# Patient Record
Sex: FEMALE | Race: WHITE | NOT HISPANIC OR LATINO
[De-identification: names, ages, dates, MRNs, and addresses within clinical notes are randomized per-mention and may not be internally consistent; named-entity substitution may affect disease eponyms.]

---

## 2019-03-26 DIAGNOSIS — K21.9 GASTRO-ESOPHAGEAL REFLUX DISEASE W/OUT ESOPHAGITIS: ICD-10-CM

## 2019-03-26 DIAGNOSIS — Z87.09 PERSONAL HISTORY OF OTHER DISEASES OF THE RESPIRATORY SYSTEM: ICD-10-CM

## 2019-03-26 DIAGNOSIS — E78.5 HYPERLIPIDEMIA, UNSPECIFIED: ICD-10-CM

## 2019-03-26 DIAGNOSIS — N28.1 CYST OF KIDNEY, ACQUIRED: ICD-10-CM

## 2019-03-26 DIAGNOSIS — I10 ESSENTIAL (PRIMARY) HYPERTENSION: ICD-10-CM

## 2019-03-26 PROBLEM — Z00.00 ENCOUNTER FOR PREVENTIVE HEALTH EXAMINATION: Status: ACTIVE | Noted: 2019-03-26

## 2019-03-28 ENCOUNTER — APPOINTMENT (OUTPATIENT)
Dept: ELECTROPHYSIOLOGY | Facility: CLINIC | Age: 84
End: 2019-03-28

## 2019-03-30 ENCOUNTER — EMERGENCY (EMERGENCY)
Facility: HOSPITAL | Age: 84
LOS: 1 days | Discharge: HOME | End: 2019-03-30
Attending: EMERGENCY MEDICINE | Admitting: EMERGENCY MEDICINE
Payer: MEDICARE

## 2019-03-30 VITALS
TEMPERATURE: 99 F | OXYGEN SATURATION: 99 % | RESPIRATION RATE: 16 BRPM | HEART RATE: 69 BPM | DIASTOLIC BLOOD PRESSURE: 84 MMHG | SYSTOLIC BLOOD PRESSURE: 170 MMHG

## 2019-03-30 VITALS
HEIGHT: 65 IN | HEART RATE: 78 BPM | DIASTOLIC BLOOD PRESSURE: 87 MMHG | TEMPERATURE: 98 F | RESPIRATION RATE: 17 BRPM | SYSTOLIC BLOOD PRESSURE: 170 MMHG | WEIGHT: 145.06 LBS

## 2019-03-30 DIAGNOSIS — Z90.49 ACQUIRED ABSENCE OF OTHER SPECIFIED PARTS OF DIGESTIVE TRACT: Chronic | ICD-10-CM

## 2019-03-30 LAB
ALBUMIN SERPL ELPH-MCNC: 3.6 G/DL — SIGNIFICANT CHANGE UP (ref 3.3–5)
ALP SERPL-CCNC: 61 U/L — SIGNIFICANT CHANGE UP (ref 40–120)
ALT FLD-CCNC: 22 U/L — SIGNIFICANT CHANGE UP (ref 12–78)
ANION GAP SERPL CALC-SCNC: 10 MMOL/L — SIGNIFICANT CHANGE UP (ref 5–17)
APTT BLD: 30 SEC — SIGNIFICANT CHANGE UP (ref 27.5–36.3)
AST SERPL-CCNC: 14 U/L — LOW (ref 15–37)
BASOPHILS # BLD AUTO: 0.02 K/UL — SIGNIFICANT CHANGE UP (ref 0–0.2)
BASOPHILS NFR BLD AUTO: 0.2 % — SIGNIFICANT CHANGE UP (ref 0–2)
BILIRUB SERPL-MCNC: 0.5 MG/DL — SIGNIFICANT CHANGE UP (ref 0.2–1.2)
BUN SERPL-MCNC: 28 MG/DL — HIGH (ref 7–23)
CALCIUM SERPL-MCNC: 9.4 MG/DL — SIGNIFICANT CHANGE UP (ref 8.5–10.1)
CHLORIDE SERPL-SCNC: 109 MMOL/L — HIGH (ref 96–108)
CK MB CFR SERPL CALC: 2.5 NG/ML — SIGNIFICANT CHANGE UP (ref 0–3.6)
CO2 SERPL-SCNC: 24 MMOL/L — SIGNIFICANT CHANGE UP (ref 22–31)
CREAT SERPL-MCNC: 1 MG/DL — SIGNIFICANT CHANGE UP (ref 0.5–1.3)
EOSINOPHIL # BLD AUTO: 0.12 K/UL — SIGNIFICANT CHANGE UP (ref 0–0.5)
EOSINOPHIL NFR BLD AUTO: 1.4 % — SIGNIFICANT CHANGE UP (ref 0–6)
GLUCOSE SERPL-MCNC: 108 MG/DL — HIGH (ref 70–99)
HCT VFR BLD CALC: 37.2 % — SIGNIFICANT CHANGE UP (ref 34.5–45)
HGB BLD-MCNC: 12.8 G/DL — SIGNIFICANT CHANGE UP (ref 11.5–15.5)
IMM GRANULOCYTES NFR BLD AUTO: 0.4 % — SIGNIFICANT CHANGE UP (ref 0–1.5)
INR BLD: 1.12 RATIO — SIGNIFICANT CHANGE UP (ref 0.88–1.16)
LYMPHOCYTES # BLD AUTO: 1.61 K/UL — SIGNIFICANT CHANGE UP (ref 1–3.3)
LYMPHOCYTES # BLD AUTO: 18.8 % — SIGNIFICANT CHANGE UP (ref 13–44)
MCHC RBC-ENTMCNC: 31.1 PG — SIGNIFICANT CHANGE UP (ref 27–34)
MCHC RBC-ENTMCNC: 34.4 GM/DL — SIGNIFICANT CHANGE UP (ref 32–36)
MCV RBC AUTO: 90.5 FL — SIGNIFICANT CHANGE UP (ref 80–100)
MONOCYTES # BLD AUTO: 0.69 K/UL — SIGNIFICANT CHANGE UP (ref 0–0.9)
MONOCYTES NFR BLD AUTO: 8.1 % — SIGNIFICANT CHANGE UP (ref 2–14)
NEUTROPHILS # BLD AUTO: 6.1 K/UL — SIGNIFICANT CHANGE UP (ref 1.8–7.4)
NEUTROPHILS NFR BLD AUTO: 71.1 % — SIGNIFICANT CHANGE UP (ref 43–77)
NRBC # BLD: 0 /100 WBCS — SIGNIFICANT CHANGE UP (ref 0–0)
PLATELET # BLD AUTO: 264 K/UL — SIGNIFICANT CHANGE UP (ref 150–400)
POTASSIUM SERPL-MCNC: 4 MMOL/L — SIGNIFICANT CHANGE UP (ref 3.5–5.3)
POTASSIUM SERPL-SCNC: 4 MMOL/L — SIGNIFICANT CHANGE UP (ref 3.5–5.3)
PROT SERPL-MCNC: 7.1 G/DL — SIGNIFICANT CHANGE UP (ref 6–8.3)
PROTHROM AB SERPL-ACNC: 12.7 SEC — SIGNIFICANT CHANGE UP (ref 10–12.9)
RBC # BLD: 4.11 M/UL — SIGNIFICANT CHANGE UP (ref 3.8–5.2)
RBC # FLD: 11.9 % — SIGNIFICANT CHANGE UP (ref 10.3–14.5)
SODIUM SERPL-SCNC: 143 MMOL/L — SIGNIFICANT CHANGE UP (ref 135–145)
TROPONIN I SERPL-MCNC: <.015 NG/ML — SIGNIFICANT CHANGE UP (ref 0.01–0.04)
WBC # BLD: 8.57 K/UL — SIGNIFICANT CHANGE UP (ref 3.8–10.5)
WBC # FLD AUTO: 8.57 K/UL — SIGNIFICANT CHANGE UP (ref 3.8–10.5)

## 2019-03-30 PROCEDURE — 85730 THROMBOPLASTIN TIME PARTIAL: CPT

## 2019-03-30 PROCEDURE — 93005 ELECTROCARDIOGRAM TRACING: CPT

## 2019-03-30 PROCEDURE — 99283 EMERGENCY DEPT VISIT LOW MDM: CPT | Mod: 25

## 2019-03-30 PROCEDURE — 85610 PROTHROMBIN TIME: CPT

## 2019-03-30 PROCEDURE — 80053 COMPREHEN METABOLIC PANEL: CPT

## 2019-03-30 PROCEDURE — 36415 COLL VENOUS BLD VENIPUNCTURE: CPT

## 2019-03-30 PROCEDURE — 82553 CREATINE MB FRACTION: CPT

## 2019-03-30 PROCEDURE — 85027 COMPLETE CBC AUTOMATED: CPT

## 2019-03-30 PROCEDURE — 84484 ASSAY OF TROPONIN QUANT: CPT

## 2019-03-30 PROCEDURE — 99283 EMERGENCY DEPT VISIT LOW MDM: CPT

## 2019-03-30 PROCEDURE — 93010 ELECTROCARDIOGRAM REPORT: CPT

## 2019-03-30 RX ORDER — ALPRAZOLAM 0.25 MG
1 TABLET ORAL
Qty: 6 | Refills: 0 | OUTPATIENT
Start: 2019-03-30 | End: 2019-04-01

## 2019-03-30 NOTE — ED ADULT NURSE NOTE - OBJECTIVE STATEMENT
88 y/o female presents to the ed c/o an episode of sob chest pain and diaphoresis while at home. pt attributes these symptoms to her being anxious because her son is currently inpatient psych at another hospital. symptoms have since resolved. denies nausea vomiting fever chills sob headache abdominal pain and urinary problems.

## 2019-03-30 NOTE — ED ADULT NURSE NOTE - NSIMPLEMENTINTERV_GEN_ALL_ED
Implemented All Fall with Harm Risk Interventions:  [place] to call system. Call bell, personal items and telephone within reach. Instruct patient to call for assistance. Room bathroom lighting operational. Non-slip footwear when patient is off stretcher. Physically safe environment: no spills, clutter or unnecessary equipment. Stretcher in lowest position, wheels locked, appropriate side rails in place. Provide visual cue, wrist band, yellow gown, etc. Monitor gait and stability. Monitor for mental status changes and reorient to person, place, and time. Review medications for side effects contributing to fall risk. Reinforce activity limits and safety measures with patient and family. Provide visual clues: red socks.

## 2019-03-30 NOTE — ED PROVIDER NOTE - OBJECTIVE STATEMENT
87 female presents to ER with son c/o stress/anxiety. Patient states she has been under a lot of stress lately, her other son in psychiatric hung, visits him twice a day, states today she woke up at 4:30am and not feeling well, having racing heart, mild shortness of breath with some diaphoreris, patient took her lisinopril just prior to arrival. Patient now feeling better, no chest pain, currently being worked up for syncope, had recent halter monitor.  PCP- Lukas Jaimes  Cardio- Brandspiegel

## 2019-03-30 NOTE — ED PROVIDER NOTE - CARE PROVIDER_API CALL
Mynor Jaimes (DO)  Internal Medicine  [address], [address] [place] [address]  Phone: [phone]  Fax: [phone]  Follow Up Time:

## 2019-03-30 NOTE — ED PROVIDER NOTE - CARE PLAN
Principal Discharge DX:	HTN (hypertension) with goal to be determined  Secondary Diagnosis:	Stress at home

## 2019-03-30 NOTE — ED PROVIDER NOTE - PROGRESS NOTE DETAILS
patient feeling better, no chest pain, wants to go home, agrees to f/u with PMD, understands to return to ER for worsening of symptoms

## 2019-04-23 ENCOUNTER — APPOINTMENT (OUTPATIENT)
Dept: ELECTROPHYSIOLOGY | Facility: CLINIC | Age: 84
End: 2019-04-23
Payer: MEDICARE

## 2019-04-23 ENCOUNTER — NON-APPOINTMENT (OUTPATIENT)
Age: 84
End: 2019-04-23

## 2019-04-23 VITALS
HEIGHT: 65.5 IN | BODY MASS INDEX: 23.37 KG/M2 | WEIGHT: 142 LBS | DIASTOLIC BLOOD PRESSURE: 80 MMHG | HEART RATE: 84 BPM | SYSTOLIC BLOOD PRESSURE: 144 MMHG | OXYGEN SATURATION: 95 %

## 2019-04-23 DIAGNOSIS — I47.1 SUPRAVENTRICULAR TACHYCARDIA: ICD-10-CM

## 2019-04-23 DIAGNOSIS — R55 SYNCOPE AND COLLAPSE: ICD-10-CM

## 2019-04-23 PROCEDURE — 99205 OFFICE O/P NEW HI 60 MIN: CPT

## 2019-04-23 PROCEDURE — 93000 ELECTROCARDIOGRAM COMPLETE: CPT

## 2019-04-23 RX ORDER — LISINOPRIL 10 MG/1
10 TABLET ORAL DAILY
Qty: 30 | Refills: 6 | Status: ACTIVE | COMMUNITY

## 2019-04-23 RX ORDER — SACCHAROMYCES BOULARDII 50 MG
CAPSULE ORAL
Refills: 0 | Status: ACTIVE | COMMUNITY

## 2019-04-23 NOTE — DISCUSSION/SUMMARY
[FreeTextEntry1] : 88 year old woman with recurrent episodes of syncope.  Evaluation has been significant for normal LV function and normal conduction intervals on ECG.  Loop monitoring shows rapid supraventricular arrhythmia with no offset pause.  This supports the assertion of adequate conduction and makes a bradyarrhythmia seemingly unlikely.  It is possible that prolonged tachyarrhythmia could result in hypotension given her LVH with impaired relaxation.  However her history of feeling warm (?vasodilation) and poor hydration are more consistent with a vasovagal mechanism.  We discussed empiric lifestyle modifications.  I suggested that we start a low dose beta blocker therapy and that we proceed with an ILR.  SHould she have recurrent episodes despite lifestyle modifications and beta blocker therapy we may reassess for more aggressive therapy such EP testing and possible ablation versus more aggressive pharmacotherapy.

## 2019-04-23 NOTE — HISTORY OF PRESENT ILLNESS
[FreeTextEntry1] : 88 year old woman with a history of hypertension presents for an evaluation of syncope. A few weeks ago she was out to dinner.  She felt very warm and put her head down and lost consciousness.  Her diet was very poor that day.  as she was anticipating the dinner party. She refused to go to the hospital and was advised to follow up with her PMD.  She was subsequently referred for cardiovascular evaluation.  As part of this evaluation she underwent a 4 week loop.  She did have 2 other episodes of syncope.  The first was 2 years ago and both were believed to be in the setting of poor PO intake.  She has not hurt herself.  She does feel weak with the episodes.

## 2019-04-23 NOTE — PHYSICAL EXAM
[General Appearance - Well Developed] : well developed [Normal Appearance] : normal appearance [Well Groomed] : well groomed [General Appearance - Well Nourished] : well nourished [No Deformities] : no deformities [General Appearance - In No Acute Distress] : no acute distress [Normal Conjunctiva] : the conjunctiva exhibited no abnormalities [Eyelids - No Xanthelasma] : the eyelids demonstrated no xanthelasmas [Normal Oral Mucosa] : normal oral mucosa [No Oral Cyanosis] : no oral cyanosis [No Oral Pallor] : no oral pallor [Normal Jugular Venous A Waves Present] : normal jugular venous A waves present [No Jugular Venous Solorzano A Waves] : no jugular venous solorzano A waves [Normal Jugular Venous V Waves Present] : normal jugular venous V waves present [Heart Rate And Rhythm] : heart rate and rhythm were normal [Systolic grade ___/6] : A grade [unfilled]/6 systolic murmur was heard. [Heart Sounds] : normal S1 and S2 [Respiration, Rhythm And Depth] : normal respiratory rhythm and effort [Exaggerated Use Of Accessory Muscles For Inspiration] : no accessory muscle use [Auscultation Breath Sounds / Voice Sounds] : lungs were clear to auscultation bilaterally [Abdomen Tenderness] : non-tender [Abdomen Soft] : soft [Abdomen Mass (___ Cm)] : no abdominal mass palpated [Abnormal Walk] : normal gait [Gait - Sufficient For Exercise Testing] : the gait was sufficient for exercise testing [Nail Clubbing] : no clubbing of the fingernails [Cyanosis, Localized] : no localized cyanosis [Petechial Hemorrhages (___cm)] : no petechial hemorrhages [Skin Color & Pigmentation] : normal skin color and pigmentation [] : no rash [No Venous Stasis] : no venous stasis [No Skin Ulcers] : no skin ulcer [Skin Lesions] : no skin lesions [No Xanthoma] : no  xanthoma was observed [Oriented To Time, Place, And Person] : oriented to person, place, and time [Affect] : the affect was normal [Mood] : the mood was normal [No Anxiety] : not feeling anxious

## 2019-05-01 PROBLEM — I10 ESSENTIAL (PRIMARY) HYPERTENSION: Chronic | Status: ACTIVE | Noted: 2019-03-30

## 2019-05-06 ENCOUNTER — OUTPATIENT (OUTPATIENT)
Dept: OUTPATIENT SERVICES | Facility: HOSPITAL | Age: 84
LOS: 1 days | End: 2019-05-06
Payer: MEDICARE

## 2019-05-06 VITALS
RESPIRATION RATE: 16 BRPM | WEIGHT: 141.98 LBS | OXYGEN SATURATION: 98 % | HEIGHT: 65 IN | SYSTOLIC BLOOD PRESSURE: 144 MMHG | TEMPERATURE: 98 F | DIASTOLIC BLOOD PRESSURE: 86 MMHG

## 2019-05-06 DIAGNOSIS — Z90.49 ACQUIRED ABSENCE OF OTHER SPECIFIED PARTS OF DIGESTIVE TRACT: Chronic | ICD-10-CM

## 2019-05-06 DIAGNOSIS — Z98.49 CATARACT EXTRACTION STATUS, UNSPECIFIED EYE: Chronic | ICD-10-CM

## 2019-05-06 DIAGNOSIS — R55 SYNCOPE AND COLLAPSE: ICD-10-CM

## 2019-05-06 DIAGNOSIS — Z90.710 ACQUIRED ABSENCE OF BOTH CERVIX AND UTERUS: Chronic | ICD-10-CM

## 2019-05-06 PROCEDURE — 33285 INSJ SUBQ CAR RHYTHM MNTR: CPT

## 2019-05-06 PROCEDURE — C1764: CPT

## 2019-05-06 PROCEDURE — 93005 ELECTROCARDIOGRAM TRACING: CPT

## 2019-05-06 PROCEDURE — 99214 OFFICE O/P EST MOD 30 MIN: CPT

## 2019-05-06 PROCEDURE — 93010 ELECTROCARDIOGRAM REPORT: CPT

## 2019-05-06 RX ORDER — LISINOPRIL 2.5 MG/1
1 TABLET ORAL
Qty: 0 | Refills: 0 | COMMUNITY

## 2019-05-06 RX ORDER — LISINOPRIL 2.5 MG/1
0 TABLET ORAL
Qty: 0 | Refills: 0 | COMMUNITY

## 2019-05-06 RX ORDER — METOPROLOL TARTRATE 50 MG
1 TABLET ORAL
Qty: 0 | Refills: 0 | COMMUNITY

## 2019-05-06 RX ORDER — L.ACIDOPH/B.ANIMALIS/B.LONGUM 15B CELL
0 CAPSULE ORAL
Qty: 0 | Refills: 0 | COMMUNITY

## 2019-05-06 RX ORDER — HYDRALAZINE HCL 50 MG
5 TABLET ORAL ONCE
Qty: 0 | Refills: 0 | Status: COMPLETED | OUTPATIENT
Start: 2019-05-06 | End: 2019-05-06

## 2019-05-06 RX ORDER — SACCHAROMYCES BOULARDII 250 MG
1 POWDER IN PACKET (EA) ORAL
Qty: 0 | Refills: 0 | COMMUNITY

## 2019-05-06 RX ADMIN — Medication 5 MILLIGRAM(S): at 17:55

## 2019-05-06 NOTE — H&P CARDIOLOGY - PMH
GERD (gastroesophageal reflux disease)    HLD (hyperlipidemia)    Hypertension    Renal cyst    SVT (supraventricular tachycardia)    Syncope

## 2019-05-06 NOTE — H&P CARDIOLOGY - HISTORY OF PRESENT ILLNESS
This is a 87 yo   female with PMH of HTN, HLD, GERD, renal cyst, SVT and syncope. Seen and evaluated by Dr Novak for an evaluation of syncope. A few weeks ago she was out to dinner. She felt very warm and put her head down and lost consciousness. Her diet was very poor that day. as she was anticipating the dinner party. She refused to go to the hospital and was advised to follow up with her PMD. She was subsequently referred for cardiovascular evaluation. As part of this evaluation she underwent a 4 week loop. She did have 2 other episodes of syncope. The first was 2 years ago and both were believed to be in the setting of poor PO intake. She has not hurt herself. She does feel weak with the episodes.  Evaluation has been significant for normal LV function and normal conduction intervals on ECG.  Monitoring shows rapid supraventricular arrhythmia with no offset pause. This supports the assertion of adequate conduction and makes a bradyarrhythmia seemingly unlikely. It is possible that prolonged tachyarrhythmia could result in hypotension given her LVH with impaired relaxation. However her history of feeling warm (?vasodilation) and poor hydration are more consistent with a vasovagal mechanism. her and Dr Rosenthal discussed empiric lifestyle modifications and suggested that we start a low dose beta blocker therapy and that we proceed with an ILR. SHould she have recurrent episodes despite lifestyle modifications and beta blocker therapy we may reassess for more aggressive therapy such EP testing and possible ablation versus more aggressive pharmacotherapy.   Presents here today for Loop implant.  Presently asymptomatic.     EKG ( last office visit): today, Sinus Rhythm. Early transition. 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   30 day monitor showed a PAT then a erin of SVT > 200 bpm. There is no conversion pause.   Echo: 2/8/2019, Normal LV size and function. Mild concentric LVH. Impaired relaxation. LVEF 65-70%.          PCP- Lukas Jaimes  Cardio- Brandspiegel

## 2019-05-06 NOTE — H&P CARDIOLOGY - PSH
History of cholecystectomy    History of hysterectomy with bilateral oophorectomy    Status post cataract surgery

## 2019-05-15 ENCOUNTER — NON-APPOINTMENT (OUTPATIENT)
Age: 84
End: 2019-05-15

## 2019-05-15 ENCOUNTER — APPOINTMENT (OUTPATIENT)
Dept: ELECTROPHYSIOLOGY | Facility: CLINIC | Age: 84
End: 2019-05-15
Payer: MEDICARE

## 2019-05-15 VITALS
WEIGHT: 142 LBS | OXYGEN SATURATION: 99 % | SYSTOLIC BLOOD PRESSURE: 142 MMHG | HEIGHT: 65.5 IN | BODY MASS INDEX: 23.37 KG/M2 | HEART RATE: 79 BPM | DIASTOLIC BLOOD PRESSURE: 72 MMHG

## 2019-05-15 PROCEDURE — 93291 INTERROG DEV EVAL SCRMS IP: CPT

## 2019-05-15 PROCEDURE — 93000 ELECTROCARDIOGRAM COMPLETE: CPT | Mod: 59

## 2019-07-08 ENCOUNTER — APPOINTMENT (OUTPATIENT)
Dept: ELECTROPHYSIOLOGY | Facility: CLINIC | Age: 84
End: 2019-07-08
Payer: MEDICARE

## 2019-07-08 PROCEDURE — 93298 REM INTERROG DEV EVAL SCRMS: CPT

## 2019-07-08 PROCEDURE — 93299: CPT

## 2019-08-08 ENCOUNTER — APPOINTMENT (OUTPATIENT)
Dept: ELECTROPHYSIOLOGY | Facility: CLINIC | Age: 84
End: 2019-08-08
Payer: MEDICARE

## 2019-08-08 PROCEDURE — 93298 REM INTERROG DEV EVAL SCRMS: CPT

## 2019-08-08 PROCEDURE — 93299: CPT

## 2019-08-19 PROBLEM — N28.1 CYST OF KIDNEY, ACQUIRED: Chronic | Status: ACTIVE | Noted: 2019-05-06

## 2019-08-19 PROBLEM — E78.5 HYPERLIPIDEMIA, UNSPECIFIED: Chronic | Status: ACTIVE | Noted: 2019-05-06

## 2019-08-19 PROBLEM — R55 SYNCOPE AND COLLAPSE: Chronic | Status: ACTIVE | Noted: 2019-05-06

## 2019-08-19 PROBLEM — I47.1 SUPRAVENTRICULAR TACHYCARDIA: Chronic | Status: ACTIVE | Noted: 2019-05-06

## 2019-08-19 PROBLEM — K21.9 GASTRO-ESOPHAGEAL REFLUX DISEASE WITHOUT ESOPHAGITIS: Chronic | Status: ACTIVE | Noted: 2019-05-06

## 2019-09-12 ENCOUNTER — APPOINTMENT (OUTPATIENT)
Dept: ELECTROPHYSIOLOGY | Facility: CLINIC | Age: 84
End: 2019-09-12
Payer: MEDICARE

## 2019-09-12 PROCEDURE — 93298 REM INTERROG DEV EVAL SCRMS: CPT

## 2019-09-12 PROCEDURE — 93299: CPT

## 2019-10-15 ENCOUNTER — APPOINTMENT (OUTPATIENT)
Dept: ELECTROPHYSIOLOGY | Facility: CLINIC | Age: 84
End: 2019-10-15
Payer: MEDICARE

## 2019-10-15 PROCEDURE — 93298 REM INTERROG DEV EVAL SCRMS: CPT

## 2019-10-15 PROCEDURE — 93299: CPT

## 2019-11-15 ENCOUNTER — APPOINTMENT (OUTPATIENT)
Dept: ELECTROPHYSIOLOGY | Facility: CLINIC | Age: 84
End: 2019-11-15
Payer: MEDICARE

## 2019-11-15 PROCEDURE — 93298 REM INTERROG DEV EVAL SCRMS: CPT

## 2019-11-15 PROCEDURE — 93299: CPT

## 2019-12-09 ENCOUNTER — RX RENEWAL (OUTPATIENT)
Age: 84
End: 2019-12-09

## 2019-12-24 ENCOUNTER — APPOINTMENT (OUTPATIENT)
Dept: ELECTROPHYSIOLOGY | Facility: CLINIC | Age: 84
End: 2019-12-24
Payer: MEDICARE

## 2019-12-24 PROCEDURE — 93298 REM INTERROG DEV EVAL SCRMS: CPT

## 2019-12-24 PROCEDURE — 93299: CPT

## 2020-01-24 ENCOUNTER — APPOINTMENT (OUTPATIENT)
Dept: ELECTROPHYSIOLOGY | Facility: CLINIC | Age: 85
End: 2020-01-24
Payer: MEDICARE

## 2020-01-24 PROCEDURE — 93298 REM INTERROG DEV EVAL SCRMS: CPT

## 2020-01-24 PROCEDURE — G2066: CPT

## 2020-02-24 ENCOUNTER — APPOINTMENT (OUTPATIENT)
Dept: ELECTROPHYSIOLOGY | Facility: CLINIC | Age: 85
End: 2020-02-24
Payer: MEDICARE

## 2020-02-24 PROCEDURE — 93298 REM INTERROG DEV EVAL SCRMS: CPT

## 2020-02-24 PROCEDURE — G2066: CPT

## 2020-03-26 ENCOUNTER — APPOINTMENT (OUTPATIENT)
Dept: ELECTROPHYSIOLOGY | Facility: CLINIC | Age: 85
End: 2020-03-26
Payer: MEDICARE

## 2020-03-26 PROCEDURE — 93298 REM INTERROG DEV EVAL SCRMS: CPT

## 2020-03-26 PROCEDURE — G2066: CPT

## 2020-04-27 ENCOUNTER — APPOINTMENT (OUTPATIENT)
Dept: ELECTROPHYSIOLOGY | Facility: CLINIC | Age: 85
End: 2020-04-27
Payer: MEDICARE

## 2020-04-27 PROCEDURE — G2066: CPT

## 2020-04-27 PROCEDURE — 93298 REM INTERROG DEV EVAL SCRMS: CPT

## 2020-05-28 ENCOUNTER — APPOINTMENT (OUTPATIENT)
Dept: ELECTROPHYSIOLOGY | Facility: CLINIC | Age: 85
End: 2020-05-28
Payer: MEDICARE

## 2020-05-28 PROCEDURE — G2066: CPT

## 2020-05-28 PROCEDURE — 93298 REM INTERROG DEV EVAL SCRMS: CPT

## 2020-06-08 ENCOUNTER — RX RENEWAL (OUTPATIENT)
Age: 85
End: 2020-06-08

## 2020-08-03 ENCOUNTER — APPOINTMENT (OUTPATIENT)
Dept: ELECTROPHYSIOLOGY | Facility: CLINIC | Age: 85
End: 2020-08-03
Payer: MEDICARE

## 2020-08-03 PROCEDURE — G2066: CPT

## 2020-08-03 PROCEDURE — 93298 REM INTERROG DEV EVAL SCRMS: CPT

## 2020-09-04 ENCOUNTER — APPOINTMENT (OUTPATIENT)
Dept: ELECTROPHYSIOLOGY | Facility: CLINIC | Age: 85
End: 2020-09-04
Payer: MEDICARE

## 2020-09-04 PROCEDURE — 93298 REM INTERROG DEV EVAL SCRMS: CPT

## 2020-09-04 PROCEDURE — G2066: CPT

## 2020-09-23 ENCOUNTER — RX RENEWAL (OUTPATIENT)
Age: 85
End: 2020-09-23

## 2020-10-09 ENCOUNTER — APPOINTMENT (OUTPATIENT)
Dept: ELECTROPHYSIOLOGY | Facility: CLINIC | Age: 85
End: 2020-10-09
Payer: MEDICARE

## 2020-10-09 PROCEDURE — G2066: CPT

## 2020-10-09 PROCEDURE — 93298 REM INTERROG DEV EVAL SCRMS: CPT

## 2020-11-13 ENCOUNTER — APPOINTMENT (OUTPATIENT)
Dept: ELECTROPHYSIOLOGY | Facility: CLINIC | Age: 85
End: 2020-11-13
Payer: MEDICARE

## 2020-11-13 PROCEDURE — G2066: CPT

## 2020-11-13 PROCEDURE — 93298 REM INTERROG DEV EVAL SCRMS: CPT

## 2020-12-11 NOTE — ED ADULT NURSE NOTE - NSFALLRSKASSESASSIST_ED_ALL_ED
Spoke with pt and she always gets yeast infections from antibiotics. Per Dr Antoinette White prescription sent for this along with the antibiotic to [facility] and pt aware. no

## 2020-12-18 ENCOUNTER — APPOINTMENT (OUTPATIENT)
Dept: ELECTROPHYSIOLOGY | Facility: CLINIC | Age: 85
End: 2020-12-18
Payer: MEDICARE

## 2020-12-18 PROCEDURE — 93298 REM INTERROG DEV EVAL SCRMS: CPT

## 2020-12-18 PROCEDURE — G2066: CPT

## 2021-01-21 ENCOUNTER — APPOINTMENT (OUTPATIENT)
Dept: ELECTROPHYSIOLOGY | Facility: CLINIC | Age: 86
End: 2021-01-21
Payer: MEDICARE

## 2021-01-21 PROCEDURE — 93298 REM INTERROG DEV EVAL SCRMS: CPT

## 2021-01-21 PROCEDURE — G2066: CPT

## 2021-02-26 ENCOUNTER — NON-APPOINTMENT (OUTPATIENT)
Age: 86
End: 2021-02-26

## 2021-02-26 ENCOUNTER — APPOINTMENT (OUTPATIENT)
Dept: ELECTROPHYSIOLOGY | Facility: CLINIC | Age: 86
End: 2021-02-26
Payer: MEDICARE

## 2021-02-26 PROCEDURE — 93298 REM INTERROG DEV EVAL SCRMS: CPT

## 2021-02-26 PROCEDURE — G2066: CPT

## 2021-04-02 ENCOUNTER — NON-APPOINTMENT (OUTPATIENT)
Age: 86
End: 2021-04-02

## 2021-04-02 ENCOUNTER — APPOINTMENT (OUTPATIENT)
Dept: ELECTROPHYSIOLOGY | Facility: CLINIC | Age: 86
End: 2021-04-02
Payer: MEDICARE

## 2021-04-02 PROCEDURE — 93298 REM INTERROG DEV EVAL SCRMS: CPT

## 2021-04-02 PROCEDURE — G2066: CPT

## 2021-05-07 ENCOUNTER — NON-APPOINTMENT (OUTPATIENT)
Age: 86
End: 2021-05-07

## 2021-05-07 ENCOUNTER — APPOINTMENT (OUTPATIENT)
Dept: ELECTROPHYSIOLOGY | Facility: CLINIC | Age: 86
End: 2021-05-07

## 2021-05-07 ENCOUNTER — APPOINTMENT (OUTPATIENT)
Dept: ELECTROPHYSIOLOGY | Facility: CLINIC | Age: 86
End: 2021-05-07
Payer: MEDICARE

## 2021-05-07 PROCEDURE — G2066: CPT

## 2021-05-07 PROCEDURE — 93298 REM INTERROG DEV EVAL SCRMS: CPT

## 2021-06-11 ENCOUNTER — NON-APPOINTMENT (OUTPATIENT)
Age: 86
End: 2021-06-11

## 2021-06-11 ENCOUNTER — APPOINTMENT (OUTPATIENT)
Dept: ELECTROPHYSIOLOGY | Facility: CLINIC | Age: 86
End: 2021-06-11
Payer: MEDICARE

## 2021-06-11 PROCEDURE — 93298 REM INTERROG DEV EVAL SCRMS: CPT

## 2021-06-11 PROCEDURE — G2066: CPT

## 2021-07-16 ENCOUNTER — NON-APPOINTMENT (OUTPATIENT)
Age: 86
End: 2021-07-16

## 2021-07-16 ENCOUNTER — APPOINTMENT (OUTPATIENT)
Dept: ELECTROPHYSIOLOGY | Facility: CLINIC | Age: 86
End: 2021-07-16
Payer: MEDICARE

## 2021-07-16 PROCEDURE — 93298 REM INTERROG DEV EVAL SCRMS: CPT

## 2021-07-16 PROCEDURE — G2066: CPT

## 2021-08-16 ENCOUNTER — RX RENEWAL (OUTPATIENT)
Age: 86
End: 2021-08-16

## 2021-08-20 ENCOUNTER — APPOINTMENT (OUTPATIENT)
Dept: ELECTROPHYSIOLOGY | Facility: CLINIC | Age: 86
End: 2021-08-20
Payer: MEDICARE

## 2021-08-20 ENCOUNTER — NON-APPOINTMENT (OUTPATIENT)
Age: 86
End: 2021-08-20

## 2021-08-20 PROCEDURE — 93298 REM INTERROG DEV EVAL SCRMS: CPT

## 2021-08-20 PROCEDURE — G2066: CPT

## 2021-09-24 ENCOUNTER — APPOINTMENT (OUTPATIENT)
Dept: ELECTROPHYSIOLOGY | Facility: CLINIC | Age: 86
End: 2021-09-24
Payer: MEDICARE

## 2021-09-24 ENCOUNTER — NON-APPOINTMENT (OUTPATIENT)
Age: 86
End: 2021-09-24

## 2021-09-24 PROCEDURE — 93298 REM INTERROG DEV EVAL SCRMS: CPT

## 2021-09-24 PROCEDURE — G2066: CPT

## 2021-10-25 ENCOUNTER — TRANSCRIPTION ENCOUNTER (OUTPATIENT)
Age: 86
End: 2021-10-25

## 2021-10-29 ENCOUNTER — APPOINTMENT (OUTPATIENT)
Dept: ELECTROPHYSIOLOGY | Facility: CLINIC | Age: 86
End: 2021-10-29
Payer: MEDICARE

## 2021-10-29 ENCOUNTER — NON-APPOINTMENT (OUTPATIENT)
Age: 86
End: 2021-10-29

## 2021-10-29 PROCEDURE — G2066: CPT

## 2021-10-29 PROCEDURE — 93298 REM INTERROG DEV EVAL SCRMS: CPT

## 2021-12-02 ENCOUNTER — RX RENEWAL (OUTPATIENT)
Age: 86
End: 2021-12-02

## 2021-12-02 RX ORDER — METOPROLOL SUCCINATE 25 MG/1
25 TABLET, EXTENDED RELEASE ORAL
Qty: 90 | Refills: 0 | Status: ACTIVE | COMMUNITY
Start: 2019-04-23 | End: 1900-01-01

## 2021-12-03 ENCOUNTER — APPOINTMENT (OUTPATIENT)
Dept: ELECTROPHYSIOLOGY | Facility: CLINIC | Age: 86
End: 2021-12-03
Payer: MEDICARE

## 2021-12-03 ENCOUNTER — NON-APPOINTMENT (OUTPATIENT)
Age: 86
End: 2021-12-03

## 2021-12-03 PROCEDURE — 93298 REM INTERROG DEV EVAL SCRMS: CPT

## 2021-12-03 PROCEDURE — G2066: CPT

## 2022-01-07 ENCOUNTER — NON-APPOINTMENT (OUTPATIENT)
Age: 87
End: 2022-01-07

## 2022-01-07 ENCOUNTER — APPOINTMENT (OUTPATIENT)
Dept: ELECTROPHYSIOLOGY | Facility: CLINIC | Age: 87
End: 2022-01-07
Payer: MEDICARE

## 2022-01-07 PROCEDURE — 93298 REM INTERROG DEV EVAL SCRMS: CPT

## 2022-01-07 PROCEDURE — G2066: CPT

## 2022-02-11 ENCOUNTER — NON-APPOINTMENT (OUTPATIENT)
Age: 87
End: 2022-02-11

## 2022-02-11 ENCOUNTER — APPOINTMENT (OUTPATIENT)
Dept: ELECTROPHYSIOLOGY | Facility: CLINIC | Age: 87
End: 2022-02-11
Payer: COMMERCIAL

## 2022-02-11 PROCEDURE — 93298 REM INTERROG DEV EVAL SCRMS: CPT

## 2022-02-11 PROCEDURE — G2066: CPT

## 2022-03-18 ENCOUNTER — APPOINTMENT (OUTPATIENT)
Dept: ELECTROPHYSIOLOGY | Facility: CLINIC | Age: 87
End: 2022-03-18
Payer: COMMERCIAL

## 2022-03-18 ENCOUNTER — NON-APPOINTMENT (OUTPATIENT)
Age: 87
End: 2022-03-18

## 2022-03-18 PROCEDURE — 93298 REM INTERROG DEV EVAL SCRMS: CPT

## 2022-03-18 PROCEDURE — G2066: CPT

## 2022-04-18 ENCOUNTER — NON-APPOINTMENT (OUTPATIENT)
Age: 87
End: 2022-04-18

## 2022-04-18 ENCOUNTER — APPOINTMENT (OUTPATIENT)
Dept: ELECTROPHYSIOLOGY | Facility: CLINIC | Age: 87
End: 2022-04-18
Payer: MEDICARE

## 2022-04-18 PROCEDURE — 93298 REM INTERROG DEV EVAL SCRMS: CPT

## 2022-04-18 PROCEDURE — G2066: CPT

## 2022-05-23 ENCOUNTER — NON-APPOINTMENT (OUTPATIENT)
Age: 87
End: 2022-05-23

## 2022-05-23 ENCOUNTER — APPOINTMENT (OUTPATIENT)
Dept: ELECTROPHYSIOLOGY | Facility: CLINIC | Age: 87
End: 2022-05-23
Payer: MEDICARE

## 2022-05-23 PROCEDURE — 93298 REM INTERROG DEV EVAL SCRMS: CPT

## 2022-05-23 PROCEDURE — G2066: CPT

## 2022-06-27 ENCOUNTER — NON-APPOINTMENT (OUTPATIENT)
Age: 87
End: 2022-06-27

## 2022-06-27 ENCOUNTER — APPOINTMENT (OUTPATIENT)
Dept: ELECTROPHYSIOLOGY | Facility: CLINIC | Age: 87
End: 2022-06-27

## 2022-06-27 PROCEDURE — 93298 REM INTERROG DEV EVAL SCRMS: CPT

## 2022-06-27 PROCEDURE — G2066: CPT

## 2022-08-01 ENCOUNTER — APPOINTMENT (OUTPATIENT)
Dept: ELECTROPHYSIOLOGY | Facility: CLINIC | Age: 87
End: 2022-08-01

## 2022-08-01 ENCOUNTER — NON-APPOINTMENT (OUTPATIENT)
Age: 87
End: 2022-08-01

## 2022-08-01 PROCEDURE — G2066: CPT

## 2022-08-01 PROCEDURE — 93298 REM INTERROG DEV EVAL SCRMS: CPT

## 2022-09-06 ENCOUNTER — APPOINTMENT (OUTPATIENT)
Dept: ELECTROPHYSIOLOGY | Facility: CLINIC | Age: 87
End: 2022-09-06

## 2022-09-06 ENCOUNTER — NON-APPOINTMENT (OUTPATIENT)
Age: 87
End: 2022-09-06

## 2022-09-06 PROCEDURE — 93298 REM INTERROG DEV EVAL SCRMS: CPT

## 2022-09-06 PROCEDURE — G2066: CPT

## 2022-10-11 ENCOUNTER — NON-APPOINTMENT (OUTPATIENT)
Age: 87
End: 2022-10-11

## 2022-10-11 ENCOUNTER — APPOINTMENT (OUTPATIENT)
Dept: ELECTROPHYSIOLOGY | Facility: CLINIC | Age: 87
End: 2022-10-11

## 2022-10-11 PROCEDURE — 93298 REM INTERROG DEV EVAL SCRMS: CPT

## 2022-10-11 PROCEDURE — G2066: CPT

## 2022-11-15 ENCOUNTER — APPOINTMENT (OUTPATIENT)
Dept: ELECTROPHYSIOLOGY | Facility: CLINIC | Age: 87
End: 2022-11-15

## 2022-11-15 ENCOUNTER — NON-APPOINTMENT (OUTPATIENT)
Age: 87
End: 2022-11-15

## 2022-11-15 PROCEDURE — 93298 REM INTERROG DEV EVAL SCRMS: CPT

## 2022-11-15 PROCEDURE — G2066: CPT

## 2022-12-20 ENCOUNTER — APPOINTMENT (OUTPATIENT)
Dept: ELECTROPHYSIOLOGY | Facility: CLINIC | Age: 87
End: 2022-12-20

## 2022-12-20 ENCOUNTER — NON-APPOINTMENT (OUTPATIENT)
Age: 87
End: 2022-12-20

## 2022-12-20 PROCEDURE — 93298 REM INTERROG DEV EVAL SCRMS: CPT

## 2022-12-20 PROCEDURE — G2066: CPT

## 2023-01-20 ENCOUNTER — OFFICE (OUTPATIENT)
Dept: URBAN - METROPOLITAN AREA CLINIC 109 | Facility: CLINIC | Age: 88
Setting detail: OPHTHALMOLOGY
End: 2023-01-20
Payer: MEDICARE

## 2023-01-20 DIAGNOSIS — H02.825: ICD-10-CM

## 2023-01-20 DIAGNOSIS — H02.832: ICD-10-CM

## 2023-01-20 DIAGNOSIS — H02.834: ICD-10-CM

## 2023-01-20 DIAGNOSIS — H02.835: ICD-10-CM

## 2023-01-20 DIAGNOSIS — H02.831: ICD-10-CM

## 2023-01-20 DIAGNOSIS — H26.493: ICD-10-CM

## 2023-01-20 PROCEDURE — 92012 INTRM OPH EXAM EST PATIENT: CPT | Performed by: OPHTHALMOLOGY

## 2023-01-20 ASSESSMENT — REFRACTION_CURRENTRX
OS_SPHERE: -0.75
OD_AXIS: 80
OD_SPHERE: -1.25
OD_ADD: +1.25
OS_ADD: +1.25
OS_SPHERE: -0.75
OD_OVR_VA: 20/
OD_SPHERE: -0.75
OD_OVR_VA: 20/
OS_ADD: +2.25
OD_CYLINDER: -0.58
OD_VPRISM_DIRECTION: PROGS
OS_CYLINDER: -0.25
OD_AXIS: 087
OS_OVR_VA: 20/
OS_OVR_VA: 20/
OD_CYLINDER: -1.00
OS_VPRISM_DIRECTION: PROGS
OS_AXIS: 175
OD_ADD: +2.25

## 2023-01-20 ASSESSMENT — CONFRONTATIONAL VISUAL FIELD TEST (CVF)
OS_FINDINGS: FULL
OD_FINDINGS: FULL

## 2023-01-20 ASSESSMENT — REFRACTION_MANIFEST
OS_AXIS: 175
OS_CYLINDER: -0.25
OS_VA1: 20/25+
OD_AXIS: 80
OD_CYLINDER: -1.00
OD_SPHERE: -0.75
OS_SPHERE: -0.75
OD_VA1: 20/25+

## 2023-01-20 ASSESSMENT — REFRACTION_AUTOREFRACTION
OS_CYLINDER: -1.25
OD_CYLINDER: -4.00
OD_SPHERE: +1.50
OD_AXIS: 89
OS_AXIS: 98
OS_SPHERE: -0.50

## 2023-01-20 ASSESSMENT — SPHEQUIV_DERIVED
OD_SPHEQUIV: -1.25
OS_SPHEQUIV: -0.875
OS_SPHEQUIV: -1.125
OD_SPHEQUIV: -0.5

## 2023-01-20 ASSESSMENT — VISUAL ACUITY
OS_BCVA: 20/30
OD_BCVA: 20/25-1

## 2023-01-20 ASSESSMENT — TONOMETRY
OD_IOP_MMHG: 11
OS_IOP_MMHG: 11

## 2023-01-24 ENCOUNTER — APPOINTMENT (OUTPATIENT)
Dept: ELECTROPHYSIOLOGY | Facility: CLINIC | Age: 88
End: 2023-01-24
Payer: MEDICARE

## 2023-01-24 ENCOUNTER — NON-APPOINTMENT (OUTPATIENT)
Age: 88
End: 2023-01-24

## 2023-01-24 PROCEDURE — G2066: CPT

## 2023-01-24 PROCEDURE — 93298 REM INTERROG DEV EVAL SCRMS: CPT

## 2023-02-16 ENCOUNTER — OFFICE (OUTPATIENT)
Dept: URBAN - METROPOLITAN AREA CLINIC 109 | Facility: CLINIC | Age: 88
Setting detail: OPHTHALMOLOGY
End: 2023-02-16
Payer: MEDICARE

## 2023-02-16 DIAGNOSIS — H02.825: ICD-10-CM

## 2023-02-16 DIAGNOSIS — H00.15: ICD-10-CM

## 2023-02-16 PROCEDURE — 11900 INJECT SKIN LESIONS </W 7: CPT | Performed by: OPHTHALMOLOGY

## 2023-02-16 PROCEDURE — 92285 EXTERNAL OCULAR PHOTOGRAPHY: CPT | Performed by: OPHTHALMOLOGY

## 2023-02-16 ASSESSMENT — REFRACTION_CURRENTRX
OS_SPHERE: -0.75
OS_OVR_VA: 20/
OD_AXIS: 087
OD_CYLINDER: -0.58
OD_SPHERE: -1.25
OD_SPHERE: -0.75
OS_SPHERE: -0.75
OD_ADD: +1.25
OD_VPRISM_DIRECTION: PROGS
OD_OVR_VA: 20/
OD_OVR_VA: 20/
OS_ADD: +1.25
OS_AXIS: 175
OS_ADD: +2.25
OD_AXIS: 80
OD_ADD: +2.25
OD_CYLINDER: -1.00
OS_VPRISM_DIRECTION: PROGS
OS_OVR_VA: 20/
OS_CYLINDER: -0.25

## 2023-02-16 ASSESSMENT — CONFRONTATIONAL VISUAL FIELD TEST (CVF)
OS_FINDINGS: FULL
OD_FINDINGS: FULL

## 2023-02-17 ASSESSMENT — REFRACTION_AUTOREFRACTION
OD_SPHERE: +1.50
OS_AXIS: 98
OD_CYLINDER: -4.00
OD_AXIS: 89
OS_CYLINDER: -1.25
OS_SPHERE: -0.50

## 2023-02-17 ASSESSMENT — SPHEQUIV_DERIVED
OD_SPHEQUIV: -0.5
OS_SPHEQUIV: -1.125

## 2023-02-17 ASSESSMENT — VISUAL ACUITY
OD_BCVA: 20/30
OS_BCVA: 20/40-2

## 2023-02-28 ENCOUNTER — APPOINTMENT (OUTPATIENT)
Dept: ELECTROPHYSIOLOGY | Facility: CLINIC | Age: 88
End: 2023-02-28
Payer: MEDICARE

## 2023-02-28 ENCOUNTER — NON-APPOINTMENT (OUTPATIENT)
Age: 88
End: 2023-02-28

## 2023-02-28 PROCEDURE — 93298 REM INTERROG DEV EVAL SCRMS: CPT

## 2023-02-28 PROCEDURE — G2066: CPT

## 2023-03-02 ENCOUNTER — RX ONLY (RX ONLY)
Age: 88
End: 2023-03-02

## 2023-03-02 ENCOUNTER — OFFICE (OUTPATIENT)
Dept: URBAN - METROPOLITAN AREA CLINIC 109 | Facility: CLINIC | Age: 88
Setting detail: OPHTHALMOLOGY
End: 2023-03-02
Payer: MEDICARE

## 2023-03-02 DIAGNOSIS — H00.15: ICD-10-CM

## 2023-03-02 DIAGNOSIS — H02.825: ICD-10-CM

## 2023-03-02 PROBLEM — H02.83 DERMATOCHALASIS; RIGHT UPPER LID, RIGHT LOWER LID, LEFT UPPER LID, LEFT LOWER LID: Status: ACTIVE | Noted: 2023-02-16

## 2023-03-02 PROCEDURE — 67800 REMOVE EYELID LESION: CPT | Performed by: OPHTHALMOLOGY

## 2023-03-02 PROCEDURE — 67840 REMOVE EYELID LESION: CPT | Performed by: OPHTHALMOLOGY

## 2023-03-02 ASSESSMENT — VISUAL ACUITY
OS_BCVA: 20/30
OD_BCVA: 20/20-1

## 2023-03-02 ASSESSMENT — REFRACTION_CURRENTRX
OD_ADD: +2.25
OD_CYLINDER: -0.58
OS_OVR_VA: 20/
OD_ADD: +1.25
OS_ADD: +1.25
OS_OVR_VA: 20/
OD_VPRISM_DIRECTION: PROGS
OS_AXIS: 175
OD_AXIS: 80
OS_VPRISM_DIRECTION: PROGS
OD_SPHERE: -1.25
OD_OVR_VA: 20/
OD_SPHERE: -0.75
OS_SPHERE: -0.75
OS_ADD: +2.25
OS_CYLINDER: -0.25
OD_OVR_VA: 20/
OD_AXIS: 087
OS_SPHERE: -0.75
OD_CYLINDER: -1.00

## 2023-03-02 ASSESSMENT — REFRACTION_AUTOREFRACTION
OD_CYLINDER: -4.00
OS_CYLINDER: -1.25
OD_AXIS: 89
OS_SPHERE: -0.50
OD_SPHERE: +1.50
OS_AXIS: 98

## 2023-03-02 ASSESSMENT — LID POSITION - ECTROPION
OS_ECTROPION: LLL 3+
OD_ECTROPION: RLL 1+

## 2023-03-02 ASSESSMENT — CONFRONTATIONAL VISUAL FIELD TEST (CVF)
OD_FINDINGS: FULL
OS_FINDINGS: FULL

## 2023-03-02 ASSESSMENT — SPHEQUIV_DERIVED
OD_SPHEQUIV: -0.5
OS_SPHEQUIV: -1.125

## 2023-03-16 ENCOUNTER — OFFICE (OUTPATIENT)
Dept: URBAN - METROPOLITAN AREA CLINIC 109 | Facility: CLINIC | Age: 88
Setting detail: OPHTHALMOLOGY
End: 2023-03-16
Payer: MEDICARE

## 2023-03-16 DIAGNOSIS — H02.825: ICD-10-CM

## 2023-03-16 DIAGNOSIS — H02.135: ICD-10-CM

## 2023-03-16 DIAGNOSIS — H00.15: ICD-10-CM

## 2023-03-16 DIAGNOSIS — H02.132: ICD-10-CM

## 2023-03-16 PROCEDURE — 99213 OFFICE O/P EST LOW 20 MIN: CPT | Performed by: OPHTHALMOLOGY

## 2023-03-16 ASSESSMENT — LID POSITION - ECTROPION
OD_ECTROPION: RLL 1+
OS_ECTROPION: T

## 2023-03-16 ASSESSMENT — REFRACTION_CURRENTRX
OS_VPRISM_DIRECTION: PROGS
OD_AXIS: 087
OS_OVR_VA: 20/
OD_SPHERE: -0.75
OD_VPRISM_DIRECTION: PROGS
OS_CYLINDER: -0.25
OD_CYLINDER: -0.58
OD_SPHERE: -1.25
OS_SPHERE: -0.75
OD_CYLINDER: -1.00
OS_ADD: +1.25
OS_SPHERE: -0.75
OD_OVR_VA: 20/
OS_AXIS: 175
OS_OVR_VA: 20/
OD_ADD: +1.25
OD_AXIS: 80
OD_ADD: +2.25
OS_ADD: +2.25
OD_OVR_VA: 20/

## 2023-03-16 ASSESSMENT — REFRACTION_AUTOREFRACTION
OS_CYLINDER: -1.25
OS_SPHERE: -0.50
OD_AXIS: 89
OS_AXIS: 98
OD_CYLINDER: -4.00
OD_SPHERE: +1.50

## 2023-03-16 ASSESSMENT — VISUAL ACUITY
OS_BCVA: 20/50-2
OD_BCVA: 20/40-1

## 2023-03-16 ASSESSMENT — SPHEQUIV_DERIVED
OS_SPHEQUIV: -1.125
OD_SPHEQUIV: -0.5

## 2023-03-16 ASSESSMENT — CONFRONTATIONAL VISUAL FIELD TEST (CVF)
OD_FINDINGS: FULL
OS_FINDINGS: FULL

## 2023-03-17 PROBLEM — H02.132 ECTROPION-SENILE; RIGHT LOWER LID,: Status: ACTIVE | Noted: 2023-03-02

## 2023-04-04 ENCOUNTER — APPOINTMENT (OUTPATIENT)
Dept: ELECTROPHYSIOLOGY | Facility: CLINIC | Age: 88
End: 2023-04-04

## 2023-05-24 ENCOUNTER — OFFICE (OUTPATIENT)
Dept: URBAN - METROPOLITAN AREA CLINIC 104 | Facility: CLINIC | Age: 88
Setting detail: OPHTHALMOLOGY
End: 2023-05-24
Payer: MEDICARE

## 2023-05-24 DIAGNOSIS — H02.135: ICD-10-CM

## 2023-05-24 PROCEDURE — 99214 OFFICE O/P EST MOD 30 MIN: CPT | Performed by: OPHTHALMOLOGY

## 2023-05-24 PROCEDURE — 92285 EXTERNAL OCULAR PHOTOGRAPHY: CPT | Performed by: OPHTHALMOLOGY

## 2023-05-24 ASSESSMENT — LID POSITION - ECTROPION
OD_ECTROPION: RLL 1+
OS_ECTROPION: LLL 3+

## 2023-05-24 ASSESSMENT — REFRACTION_AUTOREFRACTION
OS_CYLINDER: -1.25
OD_AXIS: 89
OS_AXIS: 98
OD_CYLINDER: -4.00
OS_SPHERE: -0.50
OD_SPHERE: +1.50

## 2023-05-24 ASSESSMENT — CONFRONTATIONAL VISUAL FIELD TEST (CVF)
OS_FINDINGS: FULL
OD_FINDINGS: FULL

## 2023-05-24 ASSESSMENT — SPHEQUIV_DERIVED
OD_SPHEQUIV: -0.5
OS_SPHEQUIV: -1.125

## 2023-05-24 ASSESSMENT — VISUAL ACUITY
OD_BCVA: 20/25+1
OS_BCVA: 20/50-2

## 2023-07-10 ENCOUNTER — OFFICE (OUTPATIENT)
Facility: LOCATION | Age: 88
Setting detail: OPHTHALMOLOGY
End: 2023-07-10
Payer: MEDICARE

## 2023-07-10 DIAGNOSIS — H02.135: ICD-10-CM

## 2023-07-10 PROCEDURE — 67950 REVISION OF EYELID: CPT | Performed by: OPHTHALMOLOGY

## 2023-07-10 PROCEDURE — 67914 REPAIR EYELID DEFECT: CPT | Performed by: OPHTHALMOLOGY

## 2023-07-10 ASSESSMENT — REFRACTION_AUTOREFRACTION
OS_AXIS: 98
OS_SPHERE: -0.50
OD_SPHERE: +1.50
OS_CYLINDER: -1.25
OD_AXIS: 89
OD_CYLINDER: -4.00

## 2023-07-10 ASSESSMENT — SPHEQUIV_DERIVED
OD_SPHEQUIV: -0.5
OS_SPHEQUIV: -1.125

## 2023-07-10 ASSESSMENT — VISUAL ACUITY
OS_BCVA: 20/50-2
OD_BCVA: 20/25+1

## 2023-07-10 ASSESSMENT — CONFRONTATIONAL VISUAL FIELD TEST (CVF)
OS_FINDINGS: FULL
OD_FINDINGS: FULL

## 2023-07-10 ASSESSMENT — LID POSITION - ECTROPION
OD_ECTROPION: RLL 1+
OS_ECTROPION: LLL 3+

## 2023-07-20 ENCOUNTER — RX ONLY (RX ONLY)
Age: 88
End: 2023-07-20

## 2023-07-20 ENCOUNTER — OFFICE (OUTPATIENT)
Dept: URBAN - METROPOLITAN AREA CLINIC 109 | Facility: CLINIC | Age: 88
Setting detail: OPHTHALMOLOGY
End: 2023-07-20
Payer: MEDICARE

## 2023-07-20 DIAGNOSIS — H02.135: ICD-10-CM

## 2023-07-20 PROBLEM — H02.132 ECTROPION-SENILE;  , RIGHT LOWER LID: Status: ACTIVE | Noted: 2023-07-20

## 2023-07-20 PROCEDURE — 99024 POSTOP FOLLOW-UP VISIT: CPT | Performed by: OPHTHALMOLOGY

## 2023-07-20 ASSESSMENT — SPHEQUIV_DERIVED
OS_SPHEQUIV: -1.125
OD_SPHEQUIV: -0.5

## 2023-07-20 ASSESSMENT — REFRACTION_AUTOREFRACTION
OD_CYLINDER: -4.00
OD_SPHERE: +1.50
OS_AXIS: 98
OD_AXIS: 89
OS_CYLINDER: -1.25
OS_SPHERE: -0.50

## 2023-07-20 ASSESSMENT — CONFRONTATIONAL VISUAL FIELD TEST (CVF)
OS_FINDINGS: FULL
OD_FINDINGS: FULL

## 2023-07-20 ASSESSMENT — LID POSITION - ECTROPION
OD_ECTROPION: RLL 1+
OS_ECTROPION: ABSENT

## 2023-07-20 ASSESSMENT — VISUAL ACUITY
OD_BCVA: 20/30+2
OS_BCVA: 20/60+1

## 2023-07-20 ASSESSMENT — LID POSITION - COMMENTS: OS_COMMENTS: WOUND C/D/I, HEALING WELL.GOOD HEIGHT AND GOOD SYMMETRY.GOOD LID TENSION.

## 2023-10-05 ENCOUNTER — OFFICE (OUTPATIENT)
Dept: URBAN - METROPOLITAN AREA CLINIC 109 | Facility: CLINIC | Age: 88
Setting detail: OPHTHALMOLOGY
End: 2023-10-05
Payer: MEDICARE

## 2023-10-05 DIAGNOSIS — H02.132: ICD-10-CM

## 2023-10-05 PROCEDURE — 99024 POSTOP FOLLOW-UP VISIT: CPT | Performed by: OPHTHALMOLOGY

## 2023-10-05 ASSESSMENT — REFRACTION_AUTOREFRACTION
OD_CYLINDER: -4.00
OD_SPHERE: +1.50
OS_SPHERE: -0.50
OS_CYLINDER: -1.25
OD_AXIS: 89
OS_AXIS: 98

## 2023-10-05 ASSESSMENT — VISUAL ACUITY
OS_BCVA: 20/60+1
OD_BCVA: 20/30+2

## 2023-10-05 ASSESSMENT — CONFRONTATIONAL VISUAL FIELD TEST (CVF)
OD_FINDINGS: FULL
OS_FINDINGS: FULL

## 2023-10-05 ASSESSMENT — SPHEQUIV_DERIVED
OS_SPHEQUIV: -1.125
OD_SPHEQUIV: -0.5

## 2023-10-05 ASSESSMENT — LID POSITION - ECTROPION
OD_ECTROPION: RLL 1+
OS_ECTROPION: ABSENT

## 2023-10-05 ASSESSMENT — LID POSITION - COMMENTS: OS_COMMENTS: GOOD HEIGHT AND GOOD SYMMETRY.GOOD LID TENSION, NO LAG.

## 2024-01-09 ENCOUNTER — OFFICE (OUTPATIENT)
Dept: URBAN - METROPOLITAN AREA CLINIC 109 | Facility: CLINIC | Age: 89
Setting detail: OPHTHALMOLOGY
End: 2024-01-09
Payer: MEDICARE

## 2024-01-09 DIAGNOSIS — H02.835: ICD-10-CM

## 2024-01-09 DIAGNOSIS — H26.491: ICD-10-CM

## 2024-01-09 DIAGNOSIS — H02.132: ICD-10-CM

## 2024-01-09 DIAGNOSIS — H02.832: ICD-10-CM

## 2024-01-09 DIAGNOSIS — H02.825: ICD-10-CM

## 2024-01-09 DIAGNOSIS — H02.831: ICD-10-CM

## 2024-01-09 DIAGNOSIS — H02.834: ICD-10-CM

## 2024-01-09 PROCEDURE — 99213 OFFICE O/P EST LOW 20 MIN: CPT | Performed by: OPHTHALMOLOGY

## 2024-01-09 ASSESSMENT — REFRACTION_AUTOREFRACTION
OS_SPHERE: -0.50
OD_AXIS: 89
OD_CYLINDER: -4.00
OS_AXIS: 98
OD_SPHERE: +1.50
OS_CYLINDER: -1.25

## 2024-01-09 ASSESSMENT — SPHEQUIV_DERIVED
OS_SPHEQUIV: -0.875
OS_SPHEQUIV: -1.125
OD_SPHEQUIV: -1.25
OD_SPHEQUIV: -0.5

## 2024-01-09 ASSESSMENT — REFRACTION_MANIFEST
OS_SPHERE: -0.75
OD_VA1: 20/30
OD_AXIS: 80
OD_SPHERE: -0.75
OS_AXIS: 1.75
OS_VA1: 20/20-
OS_CYLINDER: -0.25
OD_CYLINDER: -1.00

## 2024-01-09 ASSESSMENT — LID POSITION - ECTROPION
OS_ECTROPION: ABSENT
OD_ECTROPION: RLL 1+

## 2024-01-09 ASSESSMENT — CONFRONTATIONAL VISUAL FIELD TEST (CVF)
OD_FINDINGS: FULL
OS_FINDINGS: FULL

## 2024-01-09 ASSESSMENT — LID POSITION - COMMENTS: OS_COMMENTS: GOOD HEIGHT AND GOOD SYMMETRY.GOOD LID TENSION, NO LAG.

## 2024-03-20 ENCOUNTER — OFFICE (OUTPATIENT)
Dept: URBAN - METROPOLITAN AREA CLINIC 109 | Facility: CLINIC | Age: 89
Setting detail: OPHTHALMOLOGY
End: 2024-03-20
Payer: MEDICARE

## 2024-03-20 DIAGNOSIS — H02.832: ICD-10-CM

## 2024-03-20 DIAGNOSIS — H02.012: ICD-10-CM

## 2024-03-20 DIAGNOSIS — H02.831: ICD-10-CM

## 2024-03-20 DIAGNOSIS — H26.491: ICD-10-CM

## 2024-03-20 DIAGNOSIS — H02.834: ICD-10-CM

## 2024-03-20 DIAGNOSIS — H52.7: ICD-10-CM

## 2024-03-20 PROBLEM — H02.835 DERMATOCHALASIS; RIGHT UPPER LID, RIGHT LOWER LID, LEFT UPPER LID, LEFT LOWER LID: Status: ACTIVE | Noted: 2024-03-20

## 2024-03-20 PROCEDURE — 92015 DETERMINE REFRACTIVE STATE: CPT | Performed by: OPHTHALMOLOGY

## 2024-03-20 PROCEDURE — 67820 REVISE EYELASHES: CPT | Mod: E4 | Performed by: OPHTHALMOLOGY

## 2024-03-20 PROCEDURE — 92014 COMPRE OPH EXAM EST PT 1/>: CPT | Mod: 25 | Performed by: OPHTHALMOLOGY

## 2024-03-20 ASSESSMENT — REFRACTION_CURRENTRX
OD_ADD: +2.50
OD_AXIS: 85
OS_ADD: +2.50
OD_OVR_VA: 20/
OD_SPHERE: -1.25
OD_VPRISM_DIRECTION: PROGS
OS_VPRISM_DIRECTION: PROGS
OS_OVR_VA: 20/
OD_CYLINDER: -0.50
OS_SPHERE: -0.75

## 2024-03-20 ASSESSMENT — REFRACTION_MANIFEST
OS_ADD: +2.75
OS_AXIS: 1.75
OS_CYLINDER: -0.25
OS_SPHERE: -0.75
OS_VA1: 20/20-2
OD_AXIS: 80
OD_VA1: 20/30-2
OD_ADD: +2.75
OD_SPHERE: -0.75
OD_CYLINDER: -1.00

## 2024-03-20 ASSESSMENT — LID EXAM ASSESSMENTS
OD_TRICHIASIS: RLL T
OD_DERMATOCHALASIS: 1+

## 2024-03-20 ASSESSMENT — SPHEQUIV_DERIVED
OS_SPHEQUIV: -0.875
OD_SPHEQUIV: -1.25

## 2024-03-20 ASSESSMENT — LID POSITION - ECTROPION
OS_ECTROPION: ABSENT
OD_ECTROPION: RLL T

## 2024-03-20 ASSESSMENT — LID POSITION - COMMENTS: OS_COMMENTS: GOOD HEIGHT AND GOOD SYMMETRY.GOOD LID TENSION, NO LAG.

## 2024-09-25 ENCOUNTER — OFFICE (OUTPATIENT)
Dept: URBAN - METROPOLITAN AREA CLINIC 109 | Facility: CLINIC | Age: 89
Setting detail: OPHTHALMOLOGY
End: 2024-09-25
Payer: MEDICARE

## 2024-09-25 DIAGNOSIS — H02.835: ICD-10-CM

## 2024-09-25 DIAGNOSIS — H26.491: ICD-10-CM

## 2024-09-25 DIAGNOSIS — H02.832: ICD-10-CM

## 2024-09-25 DIAGNOSIS — H02.834: ICD-10-CM

## 2024-09-25 DIAGNOSIS — H02.831: ICD-10-CM

## 2024-09-25 PROCEDURE — 99213 OFFICE O/P EST LOW 20 MIN: CPT | Performed by: OPHTHALMOLOGY

## 2024-09-25 ASSESSMENT — LID POSITION - DERMATOCHALASIS
OD_DERMATOCHALASIS: RLL RUL 1+
OS_DERMATOCHALASIS: LLL LUL 1+

## 2024-09-25 ASSESSMENT — CONFRONTATIONAL VISUAL FIELD TEST (CVF)
OD_FINDINGS: FULL
OS_FINDINGS: FULL

## 2024-09-25 ASSESSMENT — LID POSITION - ECTROPION
OD_ECTROPION: RLL T
OS_ECTROPION: ABSENT

## 2024-09-25 ASSESSMENT — LID POSITION - COMMENTS: OS_COMMENTS: GOOD HEIGHT AND GOOD SYMMETRY.GOOD LID TENSION, NO LAG.

## 2024-09-25 ASSESSMENT — LID EXAM ASSESSMENTS: OD_TRICHIASIS: RLL T

## 2024-12-03 NOTE — ED PROVIDER NOTE - RESPIRATORY, MLM
Alert and oriented, no focal deficits, no motor or sensory deficits. Breath sounds clear and equal bilaterally.

## 2024-12-04 ENCOUNTER — OFFICE (OUTPATIENT)
Dept: URBAN - METROPOLITAN AREA CLINIC 109 | Facility: CLINIC | Age: 89
Setting detail: OPHTHALMOLOGY
End: 2024-12-04
Payer: MEDICARE

## 2024-12-04 DIAGNOSIS — H02.835: ICD-10-CM

## 2024-12-04 DIAGNOSIS — H02.834: ICD-10-CM

## 2024-12-04 DIAGNOSIS — H26.492: ICD-10-CM

## 2024-12-04 DIAGNOSIS — H02.831: ICD-10-CM

## 2024-12-04 DIAGNOSIS — H02.832: ICD-10-CM

## 2024-12-04 PROCEDURE — 92014 COMPRE OPH EXAM EST PT 1/>: CPT | Performed by: OPHTHALMOLOGY

## 2024-12-04 ASSESSMENT — LID POSITION - ECTROPION
OD_ECTROPION: RLL T
OS_ECTROPION: ABSENT

## 2024-12-04 ASSESSMENT — REFRACTION_CURRENTRX
OD_CYLINDER: -0.50
OS_OVR_VA: 20/
OS_SPHERE: -0.75
OD_ADD: +2.50
OD_SPHERE: -1.25
OS_ADD: +2.50
OD_VPRISM_DIRECTION: PROGS
OD_OVR_VA: 20/
OD_AXIS: 85
OS_VPRISM_DIRECTION: PROGS

## 2024-12-04 ASSESSMENT — REFRACTION_MANIFEST
OS_CYLINDER: -0.25
OD_VA1: 20/30-2
OS_VA1: 20/30+
OD_CYLINDER: -1.25
OD_ADD: +2.75
OD_SPHERE: PLANO
OS_SPHERE: -0.75
OD_AXIS: 95
OS_ADD: +2.75
OS_AXIS: 175

## 2024-12-04 ASSESSMENT — LID POSITION - DERMATOCHALASIS
OS_DERMATOCHALASIS: LLL LUL 1+
OD_DERMATOCHALASIS: RLL RUL 1+

## 2024-12-04 ASSESSMENT — TONOMETRY
OD_IOP_MMHG: 17
OS_IOP_MMHG: 15

## 2024-12-04 ASSESSMENT — REFRACTION_AUTOREFRACTION
OD_SPHERE: +1.50
OS_SPHERE: -0.50
OS_CYLINDER: -1.25
OD_AXIS: 89
OS_AXIS: 98
OD_CYLINDER: -4.00

## 2024-12-04 ASSESSMENT — LID POSITION - COMMENTS: OS_COMMENTS: GOOD HEIGHT AND GOOD SYMMETRY.GOOD LID TENSION, NO LAG.

## 2024-12-04 ASSESSMENT — VISUAL ACUITY
OS_BCVA: 20/50-2
OD_BCVA: 20/25-2

## 2024-12-04 ASSESSMENT — CONFRONTATIONAL VISUAL FIELD TEST (CVF)
OD_FINDINGS: FULL
OS_FINDINGS: FULL

## 2024-12-04 ASSESSMENT — LID EXAM ASSESSMENTS: OD_TRICHIASIS: RLL T

## 2025-01-21 NOTE — ED ADULT TRIAGE NOTE - SOURCE OF INFORMATION
Medication: hydrocodone  Last office visit date: 12/15/24    PDMP reviewed and documented  Last filled: 12/23/24  Last Urine test: 12/10/24  Contract last signed: 12/10/24    Sent to provider to approve or deny    Next appointment 2/6/25       Patient/Son

## 2025-06-04 ENCOUNTER — OFFICE (OUTPATIENT)
Dept: URBAN - METROPOLITAN AREA CLINIC 109 | Facility: CLINIC | Age: OVER 89
Setting detail: OPHTHALMOLOGY
End: 2025-06-04
Payer: MEDICARE

## 2025-06-04 DIAGNOSIS — H02.835: ICD-10-CM

## 2025-06-04 DIAGNOSIS — H02.832: ICD-10-CM

## 2025-06-04 DIAGNOSIS — H26.492: ICD-10-CM

## 2025-06-04 DIAGNOSIS — H02.834: ICD-10-CM

## 2025-06-04 DIAGNOSIS — H02.831: ICD-10-CM

## 2025-06-04 PROCEDURE — 99213 OFFICE O/P EST LOW 20 MIN: CPT | Performed by: OPHTHALMOLOGY

## 2025-06-04 ASSESSMENT — REFRACTION_CURRENTRX
OD_ADD: +2.50
OD_ADD: +2.75
OS_OVR_VA: 20/
OS_VPRISM_DIRECTION: PROGS
OS_SPHERE: -0.75
OS_OVR_VA: 20/
OS_ADD: +2.75
OS_SPHERE: -0.75
OS_ADD: +2.50
OD_OVR_VA: 20/
OD_AXIS: 095
OD_SPHERE: PLANO
OD_VPRISM_DIRECTION: PROGS
OD_SPHERE: -1.25
OD_AXIS: 85
OD_CYLINDER: -0.50
OS_CYLINDER: -0.25
OD_OVR_VA: 20/
OD_CYLINDER: -1.00
OS_AXIS: 180

## 2025-06-04 ASSESSMENT — VISUAL ACUITY
OS_BCVA: 20/50-1
OD_BCVA: 20/40-2

## 2025-06-04 ASSESSMENT — REFRACTION_AUTOREFRACTION
OS_AXIS: 105
OD_SPHERE: +0.75
OS_CYLINDER: -1.25
OD_AXIS: 095
OS_SPHERE: -0.50
OD_CYLINDER: -4.00

## 2025-06-04 ASSESSMENT — REFRACTION_MANIFEST
OD_VA1: 20/30-3
OS_ADD: +2.75
OS_VA1: 20/30+
OD_SPHERE: PLANO
OS_CYLINDER: -0.25
OD_VA1: 20/30-2
OD_CYLINDER: -1.25
OS_AXIS: 175
OD_CYLINDER: -1.25
OS_CYLINDER: -0.25
OS_AXIS: 175
OS_SPHERE: -0.75
OS_VA1: 20/30+
OD_AXIS: 95
OS_SPHERE: -0.75
OD_SPHERE: -1.00
OD_ADD: +2.75
OD_AXIS: 95

## 2025-06-04 ASSESSMENT — LID POSITION - ECTROPION
OS_ECTROPION: ABSENT
OD_ECTROPION: ABSENT

## 2025-06-04 ASSESSMENT — KERATOMETRY
OD_K1POWER_DIOPTERS: 42.25
OS_AXISANGLE_DEGREES: 034
OD_AXISANGLE_DEGREES: 178
OS_K1POWER_DIOPTERS: 44.00
METHOD_AUTO_MANUAL: AUTO
OS_K2POWER_DIOPTERS: 44.50
OD_K2POWER_DIOPTERS: 45.50

## 2025-06-04 ASSESSMENT — LID EXAM ASSESSMENTS: OD_TRICHIASIS: ABSENT

## 2025-06-04 ASSESSMENT — CONFRONTATIONAL VISUAL FIELD TEST (CVF)
OD_FINDINGS: FULL
OS_FINDINGS: FULL

## 2025-06-04 ASSESSMENT — LID POSITION - DERMATOCHALASIS
OS_DERMATOCHALASIS: LLL LUL 1+
OD_DERMATOCHALASIS: RLL RUL 1+

## 2025-06-04 ASSESSMENT — TONOMETRY
OD_IOP_MMHG: 15
OS_IOP_MMHG: 14
OS_IOP_MMHG: 13
OD_IOP_MMHG: 14